# Patient Record
Sex: FEMALE | Race: BLACK OR AFRICAN AMERICAN | Employment: UNEMPLOYED | URBAN - METROPOLITAN AREA
[De-identification: names, ages, dates, MRNs, and addresses within clinical notes are randomized per-mention and may not be internally consistent; named-entity substitution may affect disease eponyms.]

---

## 2018-02-04 ENCOUNTER — APPOINTMENT (OUTPATIENT)
Dept: GENERAL RADIOLOGY | Age: 58
End: 2018-02-04
Attending: EMERGENCY MEDICINE
Payer: MEDICAID

## 2018-02-04 ENCOUNTER — HOSPITAL ENCOUNTER (EMERGENCY)
Age: 58
Discharge: HOME | End: 2018-02-04
Attending: EMERGENCY MEDICINE | Admitting: EMERGENCY MEDICINE
Payer: MEDICAID

## 2018-02-04 VITALS
TEMPERATURE: 98.1 F | SYSTOLIC BLOOD PRESSURE: 143 MMHG | HEIGHT: 62 IN | DIASTOLIC BLOOD PRESSURE: 92 MMHG | BODY MASS INDEX: 32.58 KG/M2 | RESPIRATION RATE: 19 BRPM | HEART RATE: 76 BPM | OXYGEN SATURATION: 100 % | WEIGHT: 177.03 LBS

## 2018-02-04 DIAGNOSIS — R07.89 ATYPICAL CHEST PAIN: Primary | ICD-10-CM

## 2018-02-04 LAB
ALBUMIN SERPL-MCNC: 3.5 G/DL (ref 3.5–5)
ALBUMIN/GLOB SERPL: 0.9 {RATIO} (ref 1.1–2.2)
ALP SERPL-CCNC: 80 U/L (ref 45–117)
ALT SERPL-CCNC: 20 U/L (ref 12–78)
ANION GAP SERPL CALC-SCNC: 6 MMOL/L (ref 5–15)
AST SERPL-CCNC: 15 U/L (ref 15–37)
BASOPHILS # BLD: 0 K/UL (ref 0–0.1)
BASOPHILS NFR BLD: 0 % (ref 0–1)
BILIRUB SERPL-MCNC: 0.4 MG/DL (ref 0.2–1)
BUN SERPL-MCNC: 10 MG/DL (ref 6–20)
BUN/CREAT SERPL: 13 (ref 12–20)
CALCIUM SERPL-MCNC: 7.8 MG/DL (ref 8.5–10.1)
CHLORIDE SERPL-SCNC: 109 MMOL/L (ref 97–108)
CK SERPL-CCNC: 142 U/L (ref 26–192)
CO2 SERPL-SCNC: 28 MMOL/L (ref 21–32)
CREAT SERPL-MCNC: 0.75 MG/DL (ref 0.55–1.02)
DIFFERENTIAL METHOD BLD: ABNORMAL
EOSINOPHIL # BLD: 0.1 K/UL (ref 0–0.4)
EOSINOPHIL NFR BLD: 2 % (ref 0–7)
ERYTHROCYTE [DISTWIDTH] IN BLOOD BY AUTOMATED COUNT: 12.7 % (ref 11.5–14.5)
GLOBULIN SER CALC-MCNC: 4 G/DL (ref 2–4)
GLUCOSE SERPL-MCNC: 156 MG/DL (ref 65–100)
HCT VFR BLD AUTO: 35 % (ref 35–47)
HGB BLD-MCNC: 11.7 G/DL (ref 11.5–16)
IMM GRANULOCYTES # BLD: 0 K/UL (ref 0–0.04)
IMM GRANULOCYTES NFR BLD AUTO: 0 % (ref 0–0.5)
LYMPHOCYTES # BLD: 1.5 K/UL (ref 0.8–3.5)
LYMPHOCYTES NFR BLD: 27 % (ref 12–49)
MCH RBC QN AUTO: 32.3 PG (ref 26–34)
MCHC RBC AUTO-ENTMCNC: 33.4 G/DL (ref 30–36.5)
MCV RBC AUTO: 96.7 FL (ref 80–99)
MONOCYTES # BLD: 0.4 K/UL (ref 0–1)
MONOCYTES NFR BLD: 7 % (ref 5–13)
NEUTS SEG # BLD: 3.6 K/UL (ref 1.8–8)
NEUTS SEG NFR BLD: 64 % (ref 32–75)
NRBC # BLD: 0 K/UL (ref 0–0.01)
NRBC BLD-RTO: 0 PER 100 WBC
PLATELET # BLD AUTO: 238 K/UL (ref 150–400)
PMV BLD AUTO: 10 FL (ref 8.9–12.9)
POTASSIUM SERPL-SCNC: 3.2 MMOL/L (ref 3.5–5.1)
PROT SERPL-MCNC: 7.5 G/DL (ref 6.4–8.2)
RBC # BLD AUTO: 3.62 M/UL (ref 3.8–5.2)
SODIUM SERPL-SCNC: 143 MMOL/L (ref 136–145)
TROPONIN I SERPL-MCNC: <0.04 NG/ML
WBC # BLD AUTO: 5.7 K/UL (ref 3.6–11)

## 2018-02-04 PROCEDURE — 93005 ELECTROCARDIOGRAM TRACING: CPT

## 2018-02-04 PROCEDURE — 85025 COMPLETE CBC W/AUTO DIFF WBC: CPT | Performed by: EMERGENCY MEDICINE

## 2018-02-04 PROCEDURE — 99284 EMERGENCY DEPT VISIT MOD MDM: CPT

## 2018-02-04 PROCEDURE — 84484 ASSAY OF TROPONIN QUANT: CPT | Performed by: EMERGENCY MEDICINE

## 2018-02-04 PROCEDURE — 74011250636 HC RX REV CODE- 250/636: Performed by: EMERGENCY MEDICINE

## 2018-02-04 PROCEDURE — 82550 ASSAY OF CK (CPK): CPT | Performed by: EMERGENCY MEDICINE

## 2018-02-04 PROCEDURE — 80053 COMPREHEN METABOLIC PANEL: CPT | Performed by: EMERGENCY MEDICINE

## 2018-02-04 PROCEDURE — 99283 EMERGENCY DEPT VISIT LOW MDM: CPT

## 2018-02-04 PROCEDURE — 71045 X-RAY EXAM CHEST 1 VIEW: CPT

## 2018-02-04 PROCEDURE — 36415 COLL VENOUS BLD VENIPUNCTURE: CPT | Performed by: EMERGENCY MEDICINE

## 2018-02-04 RX ORDER — HEPARIN 100 UNIT/ML
300 SYRINGE INTRAVENOUS
Status: COMPLETED | OUTPATIENT
Start: 2018-02-04 | End: 2018-02-04

## 2018-02-04 RX ADMIN — SODIUM CHLORIDE, PRESERVATIVE FREE 300 UNITS: 5 INJECTION INTRAVENOUS at 18:59

## 2018-02-04 NOTE — ED PROVIDER NOTES
EMERGENCY DEPARTMENT HISTORY AND PHYSICAL EXAM      Date: 2/4/2018  Patient Name: Waymon Bumpers    History of Presenting Illness     Chief Complaint   Patient presents with    Fatigue     pt reports weakness for a couple days    Chest Pain     intermittant     History Provided By: Patient    HPI: Waymon Bumpers, 59 y.o. female with PMHx significant for sarcoidosis, lupus presents ambulatory to the ED with cc of sudden onset, intermittent episodes of faitgue, chills, chest pain, and facial numbness that occurred today. Pt notes three total similar episodes, which began two weeks ago. She will suddenly become fatigued and shaky. Her sxs will pass after she rests for some time and will last for ~ 1hour. She had another episode yesterday at [facility]. She felt as though she may pass out and went to bed. Today, she experienced left sided chest pain and facial numbness with an episode. When checked, her blood pressure was 206/118. Pt reports that she is from [place] and is visiting her children. Pt has chronic leg pain. She undergoes IVIg through a port in her chest weekly. She had a stress test 1.5 months ago. She specifically denies any fevers, nausea, vomiting, shortness of breath, headache, rash, diarrhea, sweating or weight loss. PCP: Not On File [facility]    Social Hx: - Tobacco, - EtOH, - Illicit Drugs    There are no other complaints, changes, or physical findings at this time. Current Outpatient Prescriptions   Medication Sig Dispense Refill    azithromycin (ZITHROMAX Z-BJORN) 250 mg tablet Take per package instructions 1 Each 0    HYDROcodone-acetaminophen (NORCO) 5-325 mg per tablet Take 1 Tab by mouth every four (4) hours as needed for Pain. Max Daily Amount: 6 Tabs. 20 Tab 0     Past History     Past Medical History:  No past medical history on file. Past Surgical History:  No past surgical history on file. Family History:  No family history on file.     Social History:  Social History   Substance Use Topics    Smoking status: Not on file    Smokeless tobacco: Not on file    Alcohol use Not on file     Allergies:  No Known Allergies    Review of Systems   Review of Systems   Constitutional: Positive for chills and fatigue. Negative for activity change, appetite change, fever and unexpected weight change. HENT: Negative. Negative for congestion, hearing loss, rhinorrhea, sneezing and voice change. Eyes: Negative. Negative for pain and visual disturbance. Respiratory: Negative. Negative for apnea, cough, choking, chest tightness and shortness of breath. Cardiovascular: Positive for chest pain. Negative for palpitations. Gastrointestinal: Negative. Negative for abdominal distention, abdominal pain, blood in stool, diarrhea, nausea and vomiting. Genitourinary: Negative. Negative for difficulty urinating, flank pain, frequency and urgency. No discharge   Musculoskeletal: Positive for myalgias (chronic leg pain). Negative for arthralgias, back pain and neck stiffness. Skin: Negative. Negative for color change and rash. Neurological: Positive for numbness (facial). Negative for dizziness, seizures, syncope, speech difficulty, weakness and headaches. Hematological: Negative for adenopathy. Psychiatric/Behavioral: Negative. Negative for agitation, behavioral problems, dysphoric mood and suicidal ideas. The patient is not nervous/anxious. Physical Exam   Physical Exam   Constitutional: She is oriented to person, place, and time. She appears well-developed and well-nourished. No distress. HENT:   Head: Normocephalic and atraumatic. Mouth/Throat: Oropharynx is clear and moist. No oropharyngeal exudate. Eyes: Conjunctivae and EOM are normal. Pupils are equal, round, and reactive to light. Right eye exhibits no discharge. Left eye exhibits no discharge. Neck: Normal range of motion. Neck supple. Cardiovascular: Normal rate, regular rhythm and intact distal pulses.   Exam reveals no gallop and no friction rub. No murmur heard. Pulmonary/Chest: Effort normal and breath sounds normal. No respiratory distress. She has no wheezes. She has no rales. She exhibits no tenderness. Port to right upper chest   Abdominal: Soft. Bowel sounds are normal. She exhibits no distension and no mass. There is no tenderness. There is no rebound and no guarding. Musculoskeletal: Normal range of motion. She exhibits no edema. Lymphadenopathy:     She has no cervical adenopathy. Neurological: She is alert and oriented to person, place, and time. No cranial nerve deficit. Coordination normal.   Skin: Skin is warm and dry. No rash noted. No erythema. Psychiatric: She has a normal mood and affect. Nursing note and vitals reviewed.     Diagnostic Study Results     Labs -     Recent Results (from the past 12 hour(s))   EKG, 12 LEAD, INITIAL    Collection Time: 02/04/18  4:32 PM   Result Value Ref Range    Ventricular Rate 80 BPM    Atrial Rate 80 BPM    P-R Interval 162 ms    QRS Duration 104 ms    Q-T Interval 422 ms    QTC Calculation (Bezet) 486 ms    Calculated P Axis 52 degrees    Calculated R Axis 17 degrees    Calculated T Axis 17 degrees    Diagnosis       Normal sinus rhythm  Minimal voltage criteria for LVH, may be normal variant  Inferior infarct , age undetermined  Abnormal ECG  When compared with ECG of 06-NOV-2015 12:53,  Inferior infarct is now present     CBC WITH AUTOMATED DIFF    Collection Time: 02/04/18  5:25 PM   Result Value Ref Range    WBC 5.7 3.6 - 11.0 K/uL    RBC 3.62 (L) 3.80 - 5.20 M/uL    HGB 11.7 11.5 - 16.0 g/dL    HCT 35.0 35.0 - 47.0 %    MCV 96.7 80.0 - 99.0 FL    MCH 32.3 26.0 - 34.0 PG    MCHC 33.4 30.0 - 36.5 g/dL    RDW 12.7 11.5 - 14.5 %    PLATELET 050 976 - 190 K/uL    MPV 10.0 8.9 - 12.9 FL    NRBC 0.0 0  WBC    ABSOLUTE NRBC 0.00 0.00 - 0.01 K/uL    NEUTROPHILS 64 32 - 75 %    LYMPHOCYTES 27 12 - 49 %    MONOCYTES 7 5 - 13 %    EOSINOPHILS 2 0 - 7 % BASOPHILS 0 0 - 1 %    IMMATURE GRANULOCYTES 0 0.0 - 0.5 %    ABS. NEUTROPHILS 3.6 1.8 - 8.0 K/UL    ABS. LYMPHOCYTES 1.5 0.8 - 3.5 K/UL    ABS. MONOCYTES 0.4 0.0 - 1.0 K/UL    ABS. EOSINOPHILS 0.1 0.0 - 0.4 K/UL    ABS. BASOPHILS 0.0 0.0 - 0.1 K/UL    ABS. IMM. GRANS. 0.0 0.00 - 0.04 K/UL    DF AUTOMATED     METABOLIC PANEL, COMPREHENSIVE    Collection Time: 02/04/18  5:25 PM   Result Value Ref Range    Sodium 143 136 - 145 mmol/L    Potassium 3.2 (L) 3.5 - 5.1 mmol/L    Chloride 109 (H) 97 - 108 mmol/L    CO2 28 21 - 32 mmol/L    Anion gap 6 5 - 15 mmol/L    Glucose 156 (H) 65 - 100 mg/dL    BUN 10 6 - 20 MG/DL    Creatinine 0.75 0.55 - 1.02 MG/DL    BUN/Creatinine ratio 13 12 - 20      GFR est AA >60 >60 ml/min/1.73m2    GFR est non-AA >60 >60 ml/min/1.73m2    Calcium 7.8 (L) 8.5 - 10.1 MG/DL    Bilirubin, total 0.4 0.2 - 1.0 MG/DL    ALT (SGPT) 20 12 - 78 U/L    AST (SGOT) 15 15 - 37 U/L    Alk. phosphatase 80 45 - 117 U/L    Protein, total 7.5 6.4 - 8.2 g/dL    Albumin 3.5 3.5 - 5.0 g/dL    Globulin 4.0 2.0 - 4.0 g/dL    A-G Ratio 0.9 (L) 1.1 - 2.2     CK W/ REFLX CKMB    Collection Time: 02/04/18  5:25 PM   Result Value Ref Range     26 - 192 U/L   TROPONIN I    Collection Time: 02/04/18  5:25 PM   Result Value Ref Range    Troponin-I, Qt. <0.04 <0.05 ng/mL     Radiologic Studies -     CXR Results  (Last 48 hours)               02/04/18 1733  XR CHEST PORT Final result    Impression:  IMPRESSION: No acute findings. Narrative:  EXAM:  XR CHEST PORT       INDICATION:  chest pain       COMPARISON:  11/6/2015       FINDINGS: A portable AP radiograph of the chest was obtained at 1727 hours. The   lungs and pleural margins are clear. Cardiac size remains mildly enlarged. Mild   vascular tortuosity is unchanged. Mediastinal and hilar contours are unchanged. A right-sided portacatheter is shown in the interval terminating in the superior   vena cava. The bones are mildly osteopenic. Medical Decision Making   I am the first provider for this patient. I reviewed the vital signs, available nursing notes, past medical history, past surgical history, family history and social history. Vital Signs-Reviewed the patient's vital signs. Patient Vitals for the past 12 hrs:   Temp Pulse Resp BP SpO2   02/04/18 1800 - 76 19 (!) 143/92 100 %   02/04/18 1700 - 91 21 (!) 160/98 98 %   02/04/18 1623 98.1 °F (36.7 °C) 91 18 (!) 167/98 100 %     EKG interpretation: (Preliminary) 16:32  Rhythm: normal sinus rhythm; and regular . Rate (approx.): 80; Axis: normal; OH interval: normal; QRS interval: normal ; ST/T wave: normal.  Written by JADA Gibson, as dictated by Khadar Serrano. Travis Robles MD.    Records Reviewed: Nursing Notes and Old Medical Records    Provider Notes (Medical Decision Making):   DDx: ACS, arrhythmia, flare, connective tissue disorder     ED Course:   Initial assessment performed. The patients presenting problems have been discussed, and they are in agreement with the care plan formulated and outlined with them. I have encouraged them to ask questions as they arise throughout their visit. 6:00 PM  The patient states that their symptoms have resolved and they feel much better. There are no other new complaints at this time. Her questions have been answered. We are awaiting final results and those will be reviewed with them when they become available. Disposition:  6:14 PM  Kilo Barriga  results have been reviewed with her. She has been counseled regarding her diagnosis. She verbally conveys understanding and agreement of the signs, symptoms, diagnosis, treatment and prognosis and additionally agrees to follow up as recommended with Dr. Valero On [facility] in 24 - 48 hours. She also agrees with the care-plan and conveys that all of her questions have been answered.   I have also put together some discharge instructions for her that include: 1) educational information regarding their diagnosis, 2) how to care for their diagnosis at home, as well a 3) list of reasons why they would want to return to the ED prior to their follow-up appointment, should their condition change. PLAN:  1. Discharge Medication List as of 2/4/2018  6:02 PM        2. Follow-up Information     Follow up With Details Comments Contact Info    Not On File [facility]   Not On File (62) Patient has a PCP but that physician is not listed in [address] [place] [address]. [facility] EMERGENCY DEPT  As needed, If symptoms worsen [address] [place] [address]  [phone]        Return to ED if worse     Diagnosis     Clinical Impression:   1. Atypical chest pain      Attestations:    Attestation: This note is prepared by Yue Lee, acting as scribe for [employer] Inc[employer]льга Nunez MD: The scribe's documentation has been prepared under my direction and personally reviewed by me in its entirety. I confirm that the note above accurately reflects all work, treatment, procedures, and medical decision making performed by me.

## 2018-02-04 NOTE — DISCHARGE INSTRUCTIONS

## 2018-02-04 NOTE — ED NOTES
Assumed care of patient. Patient placed in position of comfort. Call bell in reach. Skin warm and dry. Respirations even and unlabored. In no apparent distress at this time. Pt presents ambulatory into the ED with c/o Lt sided CP and generalized fatigue beginning 45 minutes PTA. Also reports sudden onset of fatigue last night, lasting about 20 minutes.

## 2018-02-05 LAB
ATRIAL RATE: 80 BPM
CALCULATED P AXIS, ECG09: 52 DEGREES
CALCULATED R AXIS, ECG10: 17 DEGREES
CALCULATED T AXIS, ECG11: 17 DEGREES
DIAGNOSIS, 93000: NORMAL
P-R INTERVAL, ECG05: 162 MS
Q-T INTERVAL, ECG07: 422 MS
QRS DURATION, ECG06: 104 MS
QTC CALCULATION (BEZET), ECG08: 486 MS
VENTRICULAR RATE, ECG03: 80 BPM

## 2018-02-05 NOTE — ED NOTES
Dr. Carrillo Clincielo at bedside to provide discharge paperwork. Vital signs stable. Pt in no apparent distress at this time. Mental status at baseline. Ambulatory to waiting room with steady gate, discharge paperwork in hand.

## 2018-05-15 ENCOUNTER — HOSPITAL ENCOUNTER (EMERGENCY)
Age: 58
Discharge: HOME | End: 2018-05-15
Attending: EMERGENCY MEDICINE
Payer: MEDICAID

## 2018-05-15 ENCOUNTER — APPOINTMENT (OUTPATIENT)
Dept: GENERAL RADIOLOGY | Age: 58
End: 2018-05-15
Attending: EMERGENCY MEDICINE
Payer: MEDICAID

## 2018-05-15 VITALS
SYSTOLIC BLOOD PRESSURE: 119 MMHG | DIASTOLIC BLOOD PRESSURE: 73 MMHG | HEART RATE: 75 BPM | HEIGHT: 63 IN | RESPIRATION RATE: 20 BRPM | OXYGEN SATURATION: 100 % | TEMPERATURE: 98.3 F | WEIGHT: 175 LBS | BODY MASS INDEX: 31.01 KG/M2

## 2018-05-15 DIAGNOSIS — R07.89 ATYPICAL CHEST PAIN: Primary | ICD-10-CM

## 2018-05-15 LAB
ANION GAP SERPL CALC-SCNC: 7 MMOL/L (ref 5–15)
ATRIAL RATE: 69 BPM
BASOPHILS # BLD: 0 K/UL (ref 0–0.1)
BASOPHILS NFR BLD: 0 % (ref 0–1)
BUN SERPL-MCNC: 11 MG/DL (ref 6–20)
BUN/CREAT SERPL: 17 (ref 12–20)
CALCIUM SERPL-MCNC: 8.5 MG/DL (ref 8.5–10.1)
CALCULATED P AXIS, ECG09: 49 DEGREES
CALCULATED R AXIS, ECG10: 19 DEGREES
CALCULATED T AXIS, ECG11: 41 DEGREES
CHLORIDE SERPL-SCNC: 108 MMOL/L (ref 97–108)
CO2 SERPL-SCNC: 26 MMOL/L (ref 21–32)
CREAT SERPL-MCNC: 0.64 MG/DL (ref 0.55–1.02)
DIAGNOSIS, 93000: NORMAL
DIFFERENTIAL METHOD BLD: ABNORMAL
EOSINOPHIL # BLD: 0 K/UL (ref 0–0.4)
EOSINOPHIL NFR BLD: 1 % (ref 0–7)
ERYTHROCYTE [DISTWIDTH] IN BLOOD BY AUTOMATED COUNT: 12.6 % (ref 11.5–14.5)
GLUCOSE SERPL-MCNC: 102 MG/DL (ref 65–100)
HCT VFR BLD AUTO: 31.9 % (ref 35–47)
HGB BLD-MCNC: 10.9 G/DL (ref 11.5–16)
IMM GRANULOCYTES # BLD: 0 K/UL (ref 0–0.04)
IMM GRANULOCYTES NFR BLD AUTO: 0 % (ref 0–0.5)
LYMPHOCYTES # BLD: 1.4 K/UL (ref 0.8–3.5)
LYMPHOCYTES NFR BLD: 32 % (ref 12–49)
MCH RBC QN AUTO: 32 PG (ref 26–34)
MCHC RBC AUTO-ENTMCNC: 34.2 G/DL (ref 30–36.5)
MCV RBC AUTO: 93.5 FL (ref 80–99)
MONOCYTES # BLD: 0.3 K/UL (ref 0–1)
MONOCYTES NFR BLD: 7 % (ref 5–13)
NEUTS SEG # BLD: 2.7 K/UL (ref 1.8–8)
NEUTS SEG NFR BLD: 60 % (ref 32–75)
NRBC # BLD: 0 K/UL (ref 0–0.01)
NRBC BLD-RTO: 0 PER 100 WBC
P-R INTERVAL, ECG05: 156 MS
PLATELET # BLD AUTO: 206 K/UL (ref 150–400)
PMV BLD AUTO: 10.1 FL (ref 8.9–12.9)
POTASSIUM SERPL-SCNC: 3.6 MMOL/L (ref 3.5–5.1)
Q-T INTERVAL, ECG07: 444 MS
QRS DURATION, ECG06: 104 MS
QTC CALCULATION (BEZET), ECG08: 475 MS
RBC # BLD AUTO: 3.41 M/UL (ref 3.8–5.2)
SODIUM SERPL-SCNC: 141 MMOL/L (ref 136–145)
TROPONIN I SERPL-MCNC: <0.04 NG/ML
VENTRICULAR RATE, ECG03: 69 BPM
WBC # BLD AUTO: 4.4 K/UL (ref 3.6–11)

## 2018-05-15 PROCEDURE — 85025 COMPLETE CBC W/AUTO DIFF WBC: CPT | Performed by: EMERGENCY MEDICINE

## 2018-05-15 PROCEDURE — 74011250636 HC RX REV CODE- 250/636: Performed by: EMERGENCY MEDICINE

## 2018-05-15 PROCEDURE — 80048 BASIC METABOLIC PNL TOTAL CA: CPT | Performed by: EMERGENCY MEDICINE

## 2018-05-15 PROCEDURE — 93306 TTE W/DOPPLER COMPLETE: CPT

## 2018-05-15 PROCEDURE — 96374 THER/PROPH/DIAG INJ IV PUSH: CPT

## 2018-05-15 PROCEDURE — 36415 COLL VENOUS BLD VENIPUNCTURE: CPT | Performed by: EMERGENCY MEDICINE

## 2018-05-15 PROCEDURE — 96361 HYDRATE IV INFUSION ADD-ON: CPT

## 2018-05-15 PROCEDURE — 99284 EMERGENCY DEPT VISIT MOD MDM: CPT

## 2018-05-15 PROCEDURE — 84484 ASSAY OF TROPONIN QUANT: CPT | Performed by: EMERGENCY MEDICINE

## 2018-05-15 PROCEDURE — 96375 TX/PRO/DX INJ NEW DRUG ADDON: CPT

## 2018-05-15 PROCEDURE — 74011250637 HC RX REV CODE- 250/637: Performed by: EMERGENCY MEDICINE

## 2018-05-15 PROCEDURE — 93005 ELECTROCARDIOGRAM TRACING: CPT

## 2018-05-15 PROCEDURE — 71045 X-RAY EXAM CHEST 1 VIEW: CPT

## 2018-05-15 RX ORDER — ASPIRIN 325 MG
325 TABLET ORAL ONCE
Status: COMPLETED | OUTPATIENT
Start: 2018-05-15 | End: 2018-05-15

## 2018-05-15 RX ORDER — HYDROXYCHLOROQUINE SULFATE 200 MG/1
200 TABLET, FILM COATED ORAL 2 TIMES DAILY
COMMUNITY

## 2018-05-15 RX ORDER — DICLOFENAC SODIUM 10 MG/G
GEL TOPICAL 4 TIMES DAILY
COMMUNITY

## 2018-05-15 RX ORDER — SODIUM CHLORIDE 9 MG/ML
150 INJECTION, SOLUTION INTRAVENOUS CONTINUOUS
Status: DISCONTINUED | OUTPATIENT
Start: 2018-05-15 | End: 2018-05-15 | Stop reason: HOSPADM

## 2018-05-15 RX ORDER — METHOTREXATE 2.5 MG/1
2.5 TABLET ORAL
COMMUNITY

## 2018-05-15 RX ORDER — HEPARIN 100 UNIT/ML
300 SYRINGE INTRAVENOUS AS NEEDED
Status: DISCONTINUED | OUTPATIENT
Start: 2018-05-15 | End: 2018-05-15 | Stop reason: HOSPADM

## 2018-05-15 RX ORDER — POLYVINYL ALCOHOL 14 MG/ML
1 SOLUTION/ DROPS OPHTHALMIC AS NEEDED
COMMUNITY

## 2018-05-15 RX ORDER — CLOPIDOGREL BISULFATE 75 MG/1
75 TABLET ORAL
COMMUNITY

## 2018-05-15 RX ORDER — MORPHINE SULFATE 4 MG/ML
2 INJECTION, SOLUTION INTRAMUSCULAR; INTRAVENOUS
Status: COMPLETED | OUTPATIENT
Start: 2018-05-15 | End: 2018-05-15

## 2018-05-15 RX ORDER — FAMOTIDINE 20 MG/1
20 TABLET, FILM COATED ORAL 2 TIMES DAILY
COMMUNITY

## 2018-05-15 RX ORDER — FOLIC ACID 1 MG/1
1 TABLET ORAL DAILY
COMMUNITY

## 2018-05-15 RX ORDER — SULFASALAZINE 500 MG/1
500 TABLET ORAL 4 TIMES DAILY
COMMUNITY

## 2018-05-15 RX ORDER — ATORVASTATIN CALCIUM 80 MG/1
80 TABLET, FILM COATED ORAL DAILY
COMMUNITY

## 2018-05-15 RX ORDER — PREGABALIN 75 MG/1
75 CAPSULE ORAL
COMMUNITY

## 2018-05-15 RX ORDER — ALBUTEROL SULFATE 90 UG/1
AEROSOL, METERED RESPIRATORY (INHALATION)
COMMUNITY

## 2018-05-15 RX ORDER — SODIUM CHLORIDE 0.9 % (FLUSH) 0.9 %
5-10 SYRINGE (ML) INJECTION EVERY 8 HOURS
Status: DISCONTINUED | OUTPATIENT
Start: 2018-05-15 | End: 2018-05-15 | Stop reason: HOSPADM

## 2018-05-15 RX ORDER — NIFEDIPINE 90 MG/1
90 TABLET, FILM COATED, EXTENDED RELEASE ORAL DAILY
COMMUNITY

## 2018-05-15 RX ORDER — CEVIMELINE HYDROCHLORIDE 30 MG/1
30 CAPSULE ORAL 3 TIMES DAILY
COMMUNITY

## 2018-05-15 RX ORDER — IBUPROFEN 800 MG/1
800 TABLET ORAL
Qty: 20 TAB | Refills: 0 | Status: SHIPPED | OUTPATIENT
Start: 2018-05-15 | End: 2018-05-22

## 2018-05-15 RX ORDER — SODIUM CHLORIDE 0.9 % (FLUSH) 0.9 %
5-10 SYRINGE (ML) INJECTION AS NEEDED
Status: DISCONTINUED | OUTPATIENT
Start: 2018-05-15 | End: 2018-05-15 | Stop reason: HOSPADM

## 2018-05-15 RX ORDER — KETOROLAC TROMETHAMINE 30 MG/ML
30 INJECTION, SOLUTION INTRAMUSCULAR; INTRAVENOUS
Status: COMPLETED | OUTPATIENT
Start: 2018-05-15 | End: 2018-05-15

## 2018-05-15 RX ORDER — CYCLOSPORINE 0.5 MG/ML
1 EMULSION OPHTHALMIC 2 TIMES DAILY
COMMUNITY

## 2018-05-15 RX ORDER — GUAIFENESIN 100 MG/5ML
81 LIQUID (ML) ORAL DAILY
COMMUNITY

## 2018-05-15 RX ADMIN — MORPHINE SULFATE 2 MG: 4 INJECTION, SOLUTION INTRAMUSCULAR; INTRAVENOUS at 11:13

## 2018-05-15 RX ADMIN — SODIUM CHLORIDE 150 ML/HR: 900 INJECTION, SOLUTION INTRAVENOUS at 10:48

## 2018-05-15 RX ADMIN — Medication 300 UNITS: at 14:19

## 2018-05-15 RX ADMIN — ASPIRIN 325 MG ORAL TABLET 325 MG: 325 PILL ORAL at 10:23

## 2018-05-15 RX ADMIN — KETOROLAC TROMETHAMINE 30 MG: 30 INJECTION INTRAMUSCULAR; INTRAVENOUS at 13:42

## 2018-05-15 NOTE — ED NOTES
Pt ambulatory independently to bathroom. Demonstrates steady gait independently. Denies worsening c/o pain with ambulation.

## 2018-05-15 NOTE — ED NOTES
Accessessed right subclavian Power Port using 20 gauge 3/4 inch ortiz needle. Blood specimens obtained and sent to lab. IVF infusing per protocol.

## 2018-05-15 NOTE — DISCHARGE INSTRUCTIONS

## 2018-05-15 NOTE — ED NOTES
Patient  given copy of dc instructions and 1 paper script(s) and 0 electronic scripts. Patient  verbalized understanding of instructions and script (s). Patient given a current medication reconciliation form and verbalized understanding of their medications. Patient (s) verbalized understanding of the importance of discussing medications with  his or her physician or clinic they will be following up with. Patient alert and oriented and in no acute distress. Patient offered wheelchair from treatment area to hospital entrance, patient declined wheelchair.

## 2018-05-15 NOTE — ED PROVIDER NOTES
EMERGENCY DEPARTMENT HISTORY AND PHYSICAL EXAM      Date: 5/15/2018  Patient Name: Judy Garcia    History of Presenting Illness     Chief Complaint   Patient presents with    Chest Pain     since 06:00 today, left side facial pain, hx HTN     History Provided By: Patient    HPI: Judy Garcia, 59 y.o. female with PMHx significant for sarcoidosis, dermatomyositis, sjogren's, TIA x 3 (on Plavix), presenting ambulatory to the ED with cc of sudden onset, constant, moderate chest pain with associated mild SOB that began this morning. Pt describes the pain as a heaviness and pressure\". She has had similar pain in the past. Pt also c/o left sided achy facial pain with \"eye jumping\". Pt is visiting [place] from [place]. She has been receiving  IVIG weekly for 2 years. She denies hx of cardiac stents. Pt specifically denies headache or numbness. Social Hx: -Tobacco, -EtOH,     There are no other complaints, changes, or physical findings at this time. PCP: PROVIDER UNKNOWN    Current Facility-Administered Medications   Medication Dose Route Frequency Provider Last Rate Last Dose    sodium chloride (NS) flush 5-10 mL  5-10 mL IntraVENous Q8H Lisha Farnsworth MD        sodium chloride (NS) flush 5-10 mL  5-10 mL IntraVENous PRN Lisha Farnsworth MD        0.9% sodium chloride infusion  150 mL/hr IntraVENous CONTINUOUS Lisha Farnsworth  mL/hr at 05/15/18 1048 150 mL/hr at 05/15/18 1048    heparin (porcine) pf 300 Units  300 Units InterCATHeter PRN Lisha Farnsworth MD         Current Outpatient Prescriptions   Medication Sig Dispense Refill    albuterol (PROVENTIL HFA, VENTOLIN HFA, PROAIR HFA) 90 mcg/actuation inhaler Take  by inhalation.  aspirin 81 mg chewable tablet Take 81 mg by mouth daily.  atorvastatin (LIPITOR) 80 mg tablet Take 80 mg by mouth daily.  calcium-cholecalciferol, d3, 600-125 mg-unit tab Take  by mouth.  cevimeline (EVOXAC) 30 mg capsule Take 30 mg by mouth three (3) times daily.       clopidogrel (PLAVIX) 75 mg tab Take 75 mg by mouth.  cycloSPORINE (RESTASIS) 0.05 % ophthalmic emulsion Administer 1 Drop to both eyes two (2) times a day.  diclofenac (VOLTAREN) 1 % gel Apply  to affected area four (4) times daily.  famotidine (PEPCID) 20 mg tablet Take 20 mg by mouth two (2) times a day.  folic acid (FOLVITE) 1 mg tablet Take 1 mg by mouth daily.  hydroxychloroquine (PLAQUENIL) 200 mg tablet Take 200 mg by mouth two (2) times a day.  Rho D immune globulin (RHOGAM) 1,500 unit (300 mcg) syrg 300 mcg by IntraMUSCular route once.  methotrexate (RHEUMATREX) 2.5 mg tablet Take 2.5 mg by mouth.  NIFEdipine ER (ADALAT CC) 90 mg ER tablet Take 90 mg by mouth daily.  polyvinyl alcohol (LIQUIFILM TEARS) 1.4 % ophthalmic solution Administer 1 Drop to both eyes as needed.  pregabalin (LYRICA) 75 mg capsule Take 75 mg by mouth.  sulfaSALAzine (AZULFIDINE) 500 mg tablet Take 500 mg by mouth four (4) times daily.  ibuprofen (MOTRIN) 800 mg tablet Take 1 Tab by mouth every six (6) hours as needed for Pain for up to 7 days. 20 Tab 0    azithromycin (ZITHROMAX Z-BJORN) 250 mg tablet Take per package instructions 1 Each 0    HYDROcodone-acetaminophen (NORCO) 5-325 mg per tablet Take 1 Tab by mouth every four (4) hours as needed for Pain. Max Daily Amount: 6 Tabs. 20 Tab 0     Past History     Past Medical History:  History reviewed. No pertinent past medical history. Past Surgical History:  History reviewed. No pertinent surgical history. Family History:  History reviewed. No pertinent family history. Social History:  Social History   Substance Use Topics    Smoking status: Never Smoker    Smokeless tobacco: Never Used    Alcohol use No     Allergies:  No Known Allergies    Review of Systems   Review of Systems   Constitutional: Negative for fever. HENT: Negative for sore throat.         + left sided facial pain   Eyes: Positive for pain. Negative for photophobia and redness. Respiratory: Positive for shortness of breath. Negative for wheezing. Cardiovascular: Positive for chest pain. Negative for leg swelling. Gastrointestinal: Negative for abdominal pain, blood in stool, nausea and vomiting. Genitourinary: Negative for difficulty urinating, dysuria, hematuria, menstrual problem and vaginal bleeding. Musculoskeletal: Negative for back pain and joint swelling. Neurological: Negative for dizziness, seizures, syncope, speech difficulty, weakness, numbness and headaches. Hematological: Negative for adenopathy. Psychiatric/Behavioral: Negative for agitation, confusion and suicidal ideas. The patient is not nervous/anxious. Physical Exam   Physical Exam   Constitutional: She is oriented to person, place, and time. She appears well-developed and well-nourished. No distress. HENT:   Head: Normocephalic and atraumatic. Mouth/Throat: Oropharynx is clear and moist. No oropharyngeal exudate. Eyes: Conjunctivae and EOM are normal. Pupils are equal, round, and reactive to light. Left eye exhibits no discharge. Neck: Normal range of motion. Neck supple. No JVD present. Cardiovascular: Normal rate, regular rhythm, normal heart sounds and intact distal pulses. Pulmonary/Chest: Effort normal and breath sounds normal. No respiratory distress. She has no wheezes. Port on R chest   Abdominal: Soft. Bowel sounds are normal. She exhibits no distension. There is no tenderness. There is no rebound and no guarding. Musculoskeletal: Normal range of motion. She exhibits no edema or tenderness. Lymphadenopathy:     She has no cervical adenopathy. Neurological: She is alert and oriented to person, place, and time. She has normal reflexes. No cranial nerve deficit. No focal deficits    Skin: Skin is warm and dry. No rash noted. Psychiatric: She has a normal mood and affect.  Her behavior is normal.   Nursing note and vitals reviewed. Diagnostic Study Results     Labs -     Recent Results (from the past 12 hour(s))   EKG, 12 LEAD, INITIAL    Collection Time: 05/15/18 10:06 AM   Result Value Ref Range    Ventricular Rate 69 BPM    Atrial Rate 69 BPM    P-R Interval 156 ms    QRS Duration 104 ms    Q-T Interval 444 ms    QTC Calculation (Bezet) 475 ms    Calculated P Axis 49 degrees    Calculated R Axis 19 degrees    Calculated T Axis 41 degrees    Diagnosis       Normal sinus rhythm  Normal ECG  When compared with ECG of 04-FEB-2018 16:32,  Criteria for Inferior infarct are no longer present  Confirmed by Jens Felix ([id number]) on 5/15/2018 12:48:12 PM     CBC WITH AUTOMATED DIFF    Collection Time: 05/15/18 10:24 AM   Result Value Ref Range    WBC 4.4 3.6 - 11.0 K/uL    RBC 3.41 (L) 3.80 - 5.20 M/uL    HGB 10.9 (L) 11.5 - 16.0 g/dL    HCT 31.9 (L) 35.0 - 47.0 %    MCV 93.5 80.0 - 99.0 FL    MCH 32.0 26.0 - 34.0 PG    MCHC 34.2 30.0 - 36.5 g/dL    RDW 12.6 11.5 - 14.5 %    PLATELET 145 140 - 152 K/uL    MPV 10.1 8.9 - 12.9 FL    NRBC 0.0 0  WBC    ABSOLUTE NRBC 0.00 0.00 - 0.01 K/uL    NEUTROPHILS 60 32 - 75 %    LYMPHOCYTES 32 12 - 49 %    MONOCYTES 7 5 - 13 %    EOSINOPHILS 1 0 - 7 %    BASOPHILS 0 0 - 1 %    IMMATURE GRANULOCYTES 0 0.0 - 0.5 %    ABS. NEUTROPHILS 2.7 1.8 - 8.0 K/UL    ABS. LYMPHOCYTES 1.4 0.8 - 3.5 K/UL    ABS. MONOCYTES 0.3 0.0 - 1.0 K/UL    ABS. EOSINOPHILS 0.0 0.0 - 0.4 K/UL    ABS. BASOPHILS 0.0 0.0 - 0.1 K/UL    ABS. IMM.  GRANS. 0.0 0.00 - 0.04 K/UL    DF AUTOMATED     METABOLIC PANEL, BASIC    Collection Time: 05/15/18 10:24 AM   Result Value Ref Range    Sodium 141 136 - 145 mmol/L    Potassium 3.6 3.5 - 5.1 mmol/L    Chloride 108 97 - 108 mmol/L    CO2 26 21 - 32 mmol/L    Anion gap 7 5 - 15 mmol/L    Glucose 102 (H) 65 - 100 mg/dL    BUN 11 6 - 20 MG/DL    Creatinine 0.64 0.55 - 1.02 MG/DL    BUN/Creatinine ratio 17 12 - 20      GFR est AA >60 >60 ml/min/1.73m2    GFR est non-AA >60 >60 ml/min/1.73m2    Calcium 8.5 8.5 - 10.1 MG/DL   TROPONIN I    Collection Time: 05/15/18 10:24 AM   Result Value Ref Range    Troponin-I, Qt. <0.04 <0.05 ng/mL     Radiologic Studies -     CXR Results  (Last 48 hours)               05/15/18 1041  XR CHEST PORT Final result    Impression:  Impression: No acute process. Narrative: Indication: Chest pain today, history of hypertension, epigastric pain       Comparison: 2/4/2018       Portable exam of the chest obtained at 1034 demonstrates normal heart size. There is no acute process in the lung fields. Port-A-Cath is unchanged in   position. Degenerative changes are seen in the thoracic spine. Medical Decision Making   I am the first provider for this patient. I reviewed the vital signs, available nursing notes, past medical history, past surgical history, family history and social history. Vital Signs-Reviewed the patient's vital signs. Patient Vitals for the past 12 hrs:   Temp Pulse Resp BP SpO2   05/15/18 1328 98.3 °F (36.8 °C) 68 18 146/88 100 %   05/15/18 1200 - 63 16 142/76 97 %   05/15/18 0955 98 °F (36.7 °C) 72 16 (!) 167/98 100 %     Pulse Oximetry Analysis - 100% on RA    Cardiac Monitor:   Rate: 72 bpm  Rhythm: Normal Sinus Rhythm     EKG interpretation: (Preliminary) 10:06   Rhythm: normal sinus rhythm; and regular . Rate (approx.): 69 bpm; Axis: normal; ND interval: normal; QRS interval: normal ; ST/T wave: normal; Other findings: normal.  Written by JADA Barnes, as dictated by Wilfrido Hoang MD.    Records Reviewed: Nursing Notes and Records from outside hospital    Provider Notes (Medical Decision Making):   DDx: Acute coronary syndrome, TIA, PE, pneumonia    ED Course:   Initial assessment performed. The patients presenting problems have been discussed, and they are in agreement with the care plan formulated and outlined with them.   I have encouraged them to ask questions as they arise throughout their visit. 11:33 AM  Progress Note:  Dr. Thierno Pascal is in ED and is seeing pt now. 11:55 AM  Progress Note:   Dr. Thierno Pascal has seen pt. He would like an echocardiogram performed. Pt can go home once results come back. 2:00 PM  Per Dr. Thierno Pascal, pt's ECHO was normal. She is ready for d/c. Written by Polo Wolf ED Scribe, as dictated by Yesenia Fung MD    Disposition:  Discharge Note:  2:06 PM  The patient has been re-evaluated and is ready for discharge. Reviewed available results with patient. Counseled patient/parent/guardian on diagnosis and care plan. Patient has expressed understanding, and all questions have been answered. Patient agrees with plan and agrees to follow up as recommended, or return to the ED if their symptoms worsen. Discharge instructions have been provided and explained to the patient, along with reasons to return to the ED. PLAN:  1. Current Discharge Medication List      START taking these medications    Details   ibuprofen (MOTRIN) 800 mg tablet Take 1 Tab by mouth every six (6) hours as needed for Pain for up to 7 days. Qty: 20 Tab, Refills: 0           2. Follow-up Information     Follow up With Details Comments Contact Info    Provider Unknown   Patient not available to ask      [facility] EMERGENCY DEPT In 1 week  Umberto Mascorro  [phone]        Return to ED if worse     Diagnosis     Clinical Impression:   1. Atypical chest pain      Attestations:    Attestation: This note is prepared by Polo Wolf, acting as scribe for MD Yesenia Ennis MD: The scribe's documentation has been prepared under my direction and personally reviewed by me in its entirety. I confirm that the note above accurately reflects all work, treatment, procedures, and medical decision making performed by me.

## 2018-05-15 NOTE — ED NOTES
Pt  With hx of high blood pressure and insulin dependent diabetes presents to the ED with c/o chest pain x1 day. Pt reports epigastric pain that feels like pressure. Pt reports non radiating pain. Pt reports pain in the left side of her face down her neck x1 day. Pt unsure of alleviating and aggravating factors. Pt repots some SOB with laying flat. Pt takes aspirin and plavix. Pt is alert and oriented. Pt skin is warm and dry. Pt is speaking in full sentences. Emergency Department Nursing Plan of Care       The Nursing Plan of Care is developed from the Nursing assessment and Emergency Department Attending provider initial evaluation. The plan of care may be reviewed in the ED Provider note.     The Plan of Care was developed with the following considerations:   Patient / Family readiness to learn indicated by:verbalized understanding  Persons(s) to be included in education: patient  Barriers to Learning/Limitations:No    Signed     Pat Irizarry    5/15/2018   10:12 AM

## 2018-05-16 NOTE — PROGRESS NOTES
Documented on 5/16/18:     Spiritual Care Partner Volunteer visited patient in ED on 5/15/18. Documented by:  Gypsy Beasley M.Div.    Paging Service [phone])

## 2019-07-16 ENCOUNTER — EMERGENCY (EMERGENCY)
Facility: HOSPITAL | Age: 59
LOS: 1 days | Discharge: HOME | End: 2019-07-16
Admitting: EMERGENCY MEDICINE
Payer: MEDICAID

## 2019-07-16 VITALS
OXYGEN SATURATION: 98 % | DIASTOLIC BLOOD PRESSURE: 94 MMHG | HEART RATE: 79 BPM | RESPIRATION RATE: 18 BRPM | TEMPERATURE: 98 F | WEIGHT: 171.96 LBS | SYSTOLIC BLOOD PRESSURE: 156 MMHG

## 2019-07-16 DIAGNOSIS — M25.572 PAIN IN LEFT ANKLE AND JOINTS OF LEFT FOOT: ICD-10-CM

## 2019-07-16 DIAGNOSIS — R20.0 ANESTHESIA OF SKIN: ICD-10-CM

## 2019-07-16 DIAGNOSIS — R53.1 WEAKNESS: ICD-10-CM

## 2019-07-16 DIAGNOSIS — M79.644 PAIN IN RIGHT FINGER(S): ICD-10-CM

## 2019-07-16 PROCEDURE — 99283 EMERGENCY DEPT VISIT LOW MDM: CPT

## 2019-07-16 PROCEDURE — 73130 X-RAY EXAM OF HAND: CPT | Mod: 26,RT

## 2019-07-16 PROCEDURE — 73610 X-RAY EXAM OF ANKLE: CPT | Mod: 26,LT

## 2019-07-16 PROCEDURE — 73130 X-RAY EXAM OF HAND: CPT

## 2019-07-16 PROCEDURE — 73610 X-RAY EXAM OF ANKLE: CPT

## 2019-07-16 PROCEDURE — 99284 EMERGENCY DEPT VISIT MOD MDM: CPT

## 2019-07-16 NOTE — ED ADULT NURSE NOTE - OBJECTIVE STATEMENT
Patient c/o left ankle pain since trip and fall 1 week ago. She denies LOC at the time of the incident. She is not taking blood thinners.

## 2019-07-16 NOTE — ED ADULT NURSE NOTE - CHPI ED NUR SYMPTOMS NEG
no weakness/no confusion/no bleeding/no tingling/no loss of consciousness/no numbness/no abrasion/no vomiting/no deformity/no fever

## 2019-07-16 NOTE — ED ADULT NURSE NOTE - NSIMPLEMENTINTERV_GEN_ALL_ED
Implemented All Fall Risk Interventions:  [place] to call system. Call bell, personal items and telephone within reach. Instruct patient to call for assistance. Room bathroom lighting operational. Non-slip footwear when patient is off stretcher. Physically safe environment: no spills, clutter or unnecessary equipment. Stretcher in lowest position, wheels locked, appropriate side rails in place. Provide visual cue, wrist band, yellow gown, etc. Monitor gait and stability. Monitor for mental status changes and reorient to person, place, and time. Review medications for side effects contributing to fall risk. Reinforce activity limits and safety measures with patient and family.

## 2019-07-16 NOTE — ED PROVIDER NOTE - PHYSICAL EXAMINATION
General Appearance: Patient is well developed and well nourished. Patient is lying in stretcher, not diaphoretic and does not appear in acute distress.      Pulm: Chest expansion symmetric bilaterally without evidence of retraction.       MSK: No noted tenderness to palpation of the medial/lateral malleolous, anterior joint. No tenderness to palpation of the tarsals, metatarsals or phalanges of the foot. No evidence of ecchymosis, deformity or edema. Full ROM ankle flexion, extension, inversion and eversion. Full ROM hip and knee. No evidence of gross deformity of hand. Full ROM of fingers, flexion, extension, abduction and adduction. Intact thumb opposition. No TTP phalanges, metacarpals, carpals, styloid proccess, or snuffbox tenderness. No evidence of thenar or hypothenar atrophy. Capillary refill <2 sec.      Neuro: Distal sensation intact in arms and legs bilaterally. Sensory Knee and ankle reflexes 2+ and symmetric bilaterally. gait steady. gcs 15    psych: mood and affect appropriate.     skin: warm dry and intact- no note of erythema edema purpura petechia ecchymosis

## 2019-07-16 NOTE — ED ADULT TRIAGE NOTE - OTHER COMPLAINTS
patient reports fall with pain to L ankle and back---+ambulatory, +full weight bearing, denies hitting head

## 2019-07-16 NOTE — ED PROVIDER NOTE - CLINICAL SUMMARY MEDICAL DECISION MAKING FREE TEXT BOX
58-year-old female presenting to the ED for evaluation of trip and fall sustained 1 week ago. Pt is continuing to experience pain in the right 5th digit in the right ankle. On PE, pt appears well and non-toxic. Abdomen is soft and non-tender. There is mild swelling to left lateral malleolus with full ROM of left ankle. Tenderness to palpation over left 5th MCP joint. Plan to order XR. 58-year-old female presenting to the ED for evaluation of trip and fall sustained 1 week ago. Pt is continuing to experience pain in the right 5th digit in the right ankle. On PE, pt appears well and non-toxic. Abdomen is soft and non-tender. There is mild swelling to left lateral malleolus with full ROM of left ankle. Tenderness to palpation over left 5th MCP joint. Plan to order XR. Per my interpretation, imaging negative for fracture. Patient understands that they may be contacted when once reviewed by radiologist if alternative read. Patient is immobilized with splint and recommend rest, ice elevation and encouraged to take tylenol and motrin for pain and follow up with orthopedices provided in discharge paperwork for continuing or worsening of symptoms. Patient understands that they may need to follow up with an orthopedist and may need an MRI for further evaluation. Patient is advised to return if any worsening of symptoms. Patient understands indications to return to the ER. Patient is agreeable with this plan. ED evaluation and management discussed with the patient and family (if available) in detail.  Close PMD follow up encouraged.  Strict ED return instructions discussed in detail and patient given the opportunity to ask any questions about their discharge diagnosis and instructions. Patient verbalized understanding. Patient is agreeable to plan.

## 2019-07-16 NOTE — ED PROVIDER NOTE - CARE PROVIDER_API CALL
Alhaji Pascal)  Orthopaedic Surgery  [address] [place] [address]  Phone: [phone]  Fax: [phone]  Follow Up Time:

## 2019-07-16 NOTE — ED PROVIDER NOTE - NSFOLLOWUPINSTRUCTIONS_ED_ALL_ED_FT
please follow up with your primary care doctor    please follow up with orthopedics if your symptoms continue    continue to take ibuprofen or tylenol for pain    Ankle Sprain  Image   An ankle sprain is a stretch or tear in a ligament in the ankle. Ligaments are tissues that connect bones to each other.    The two most common types of ankle sprains are:  Inversion sprain. This happens when the foot turns inward and the ankle rolls outward. It affects the ligament on the outside of the foot (lateral ligament).  Eversion sprain. This happens when the foot turns outward and the ankle rolls inward. It affects the ligament on the inner side of the foot (medial ligament).  What are the causes?  This condition is often caused by accidentally rolling or twisting the ankle.    What increases the risk?  This condition is more likely to develop in people who play sports.    What are the signs or symptoms?  ImageSymptoms of this condition include:  Pain in your ankle.  Swelling.  Bruising. Bruising may develop right after you sprain your ankle or 1–2 days later.  Trouble standing or walking, especially when you turn or change directions.  How is this diagnosed?  This condition is diagnosed with a physical exam. During the exam, your health care provider will press on certain parts of your foot and ankle and try to move them in certain ways. X-rays may be taken to see how severe the sprain is and to check for broken bones.    How is this treated?  This condition may be treated with:  A brace. This is used to keep the ankle from moving until it heals.  An elastic bandage. This is used to support the ankle.  Crutches.  Pain medicine.  Surgery. This may be needed if the sprain is severe.  Physical therapy. This may help to improve the range of motion in the ankle.  Follow these instructions at home:  Image   Rest your ankle.  Take over-the-counter and prescription medicines only as told by your health care provider.  For 2–3 days, keep your ankle raised (elevated) above the level of your heart as much as possible.  If directed, apply ice to the area:  Put ice in a plastic bag.  Place a towel between your skin and the bag.  Leave the ice on for 20 minutes, 2–3 times a day.  If you were given a brace:  Wear it as directed.  Remove it to shower or bathe.  Try not to move your ankle much, but wiggle your toes from time to time. This helps to prevent swelling.  If you were given an elastic bandage (dressing):  Remove it to shower or bathe.  Try not to move your ankle much, but wiggle your toes from time to time. This helps to prevent swelling.  Adjust the dressing to make it more comfortable if it feels too tight.  Loosen the dressing if you have numbness or tingling in your foot, or if your foot becomes cold and blue.  If you have crutches, use them as told by your health care provider.  Contact a health care provider if:  You have rapidly increasing bruising or swelling.  Your pain is not relieved with medicine.  Get help right away if:  Your foot or toes become numb or blue.  You have severe pain that gets worse.  Summary  An ankle sprain is a stretch or tear in a ligament in the ankle. Ligaments are tissues that connect bones to each other.  To relieve pain and swelling, place ice on the affected ankle, raise your ankle above the level of your heart, and use an elastic bandage. Also, rest as told by your health care provider.  This information is not intended to replace advice given to you by your health care provider. Make sure you discuss any questions you have with your health care provider.    Document Released: 12/18/2006 Document Revised: 01/19/2019 Document Reviewed: 07/19/2016  Elsevier Interactive Patient Education © 2019 [employer]

## 2019-07-16 NOTE — ED PROVIDER NOTE - OBJECTIVE STATEMENT
58-year-old female with no PMHx, presenting to the ED s/p fall sustained last week. Pt reports injuring the left ankle, right hand, and left elbow. She denies any head injury or LOC. Pt states she was traveling to [place] and was not seen for evaluation at the time. She reports pain with movement of right ankle and hand. She does not endorse chest pain, palpitations, coughs, shortness of breath, numbness, tingling, or weakness. Pt states she has not taken any medication for the sxs. She also states she is able to walk on the right ankle without difficulty.

## 2020-06-01 NOTE — ED ADULT NURSE NOTE - CAS ELECT INFOMATION PROVIDED
Left voicemail message with patient to return call to clinic. I may be reached at [facility] until 5:00 today, extension [phone].   f.u with orthopedics if symptoms worsen. f.u with pmd. educated on use of air cast/DC instructions

## 2024-09-17 NOTE — CONSULTS
Cardiology consultation:      Ms. Bhavana Phillip is a 59year old female with complex history who came to the ED with new onset left sided chest pain with a pleuritic component. She awakened with it this morning and it exacerbated with activity, but there is only partial improvement with rest. She is calm and in no distress during interview and exam but with ongoing left sided pleuritic type pain with thoracic movement and deep breaths. She has a history of sarcoidosis with possibly related TIA events. She has MCTD with numerous positive serologies, being managed with immune globulin infusions in addition to combination anti-inflammatory therapy    On exam, Lungs are clear with no wheezing. Peripheral pulses are intact. Cardiac auscultation shows a soft systolic murmur along upper left sternal border, normal quality S1 and S2, no gallop. There is no pleural or pericardial rub. There is no edema, there are no skin lesions. Neck vessels are normal. Abdomen in nontender. She has a CV portal in the right chest. BP is 146/88, higher on admission. There is no sign of DVT. 12 lead EKG shows sinus rhythm mild intermittent OK segment depression in lead II, with no other changes. Axis is mildly left, there are no findings for ischemia, injury, or strain. (also no S1 Q3). Chest X ray shows cardiomegaly with no other obvious findings:         Earlier this year the right atrium was prominently bukged, not so much today:           Bedside echo shows mild MR, normal LV systolic performance without regional abnormality, mild dilatation of proximal aorta, normal pericardium. The RV is mildly dilated along with its outflow tract.      Ambulatory medications:   Albuterol   Aspirin    lipitor   Vitamin D   EVOXAC 30 mg   Plaxvix   Restasis   Voltaren   Pepcid   Folvite   Plaquenil   Rho D immune globulin   Methotrexate   Nifedipine   Lyrica   Azulfidine   Toradol (today)     Labs today show mild anemia, 206K platelets, , normal electrolytes, BUN/Cr = 11/0.64, Troponin is negative.      Impression: Complex MCTD sparing the kidneys    History of steroid exposure with hip pain    History of TIA    Sarcoidosis    No sign of myocardial ischemic event    No clinical markers of pericarditis (ESR not helpful because we don't know what her baseline is)    Chest pain with pleuritic component    Plan:  Reassure the patient    No need to admit    Continue all home therapy    Provide patient with copy of consult and labs to carry to her PMD in [place]    Thank you for this consultation    Bushra Menon MD [facility] If you are a smoker, it is important for your health to stop smoking. Please be aware that second hand smoke is also harmful.